# Patient Record
(demographics unavailable — no encounter records)

---

## 2025-07-25 NOTE — HISTORY OF PRESENT ILLNESS
[CPAP] : CPAP [Good Compliance] : good compliance with treatment [Good Tolerance] : good tolerance of treatment [Good Symptom Control] : good symptom control [Follow-Up - Routine Clinic] : a routine clinic follow-up of [Excess Weight] : excess weight [Currently Experiencing] : The patient is currently experiencing symptoms. [Dyspnea] : dyspnea [High] : high [Low Calorie] : low calorie [Well Balanced Diet] : well balanced meals [None] : The patient does not exercise [TextBox_4] : Never smoker S/p Covid infections in 2021 with loss of taste and smell but no respiratory symptoms and again 8/2024 with mild symptoms. No respiratory complaints.  [ESS] : 9 [TextBox_11] : 5

## 2025-07-25 NOTE — RESULTS/DATA
[TextEntry] : CXR from 8/17/23 was normal. Home PSG from 8/28/23 revealed severe HECTOR with an AHI of 33.1. The patient's overall compliance is % with a >4hr compliance of 87-93% on autoCPAP with a median and max pressure of 8.8-10.2 and 13.4-14.5 cm of water, respectively with a residual AHI of 0.8-1.2 which is normal.

## 2025-07-25 NOTE — CONSULT LETTER
[Dear  ___] : Dear  [unfilled], [Consult Letter:] : I had the pleasure of evaluating your patient, [unfilled]. [Please see my note below.] : Please see my note below. [Consult Closing:] : Thank you very much for allowing me to participate in the care of this patient.  If you have any questions, please do not hesitate to contact me. [Sincerely,] : Sincerely, [FreeTextEntry3] : Donald Joseph MD, FCCP, D. [facility] [place]

## 2025-07-25 NOTE — END OF VISIT
[Time Spent: ___ minutes] : I have spent [unfilled] minutes of time on the encounter which excludes teaching and separately reported services. [TextEntry] : Discussed with pt at length regarding HECTOR, obesity, soboe, pnds, prior Covid infection; reviewed w/u with pt as above

## 2025-07-25 NOTE — REASON FOR VISIT
[Follow-Up] : a follow-up visit [Sleep Apnea] : sleep apnea [Shortness of Breath] : shortness of breath [Obesity] : obesity [TextBox_44] : Prior Covid infections

## 2025-07-25 NOTE — DISCUSSION/SUMMARY
[FreeTextEntry1] : #1. Aries performed previously was normal. #2. The patient does not appear to require chronic BD therapy at this time. #3. Diet and exercise for weight loss #4. SOBOE is likely at least somewhat related to weight or deconditioning.  #5. CXR to evaluate SOBOE was clear on 8/17/23. #6. Continue autoCPAP to treat severe HECTOR with an AHI of 33.1; encouraged at least 70% compliance. The patient is using and benefitting from CPAP therapy. #7. Replace PAP equipment as needed; ordered 7/25/25. #8. Flonase nasal spray for postnasal drip as needed. #9. Pt had both Covid vaccines; s/p Covid infection. #10. F/u doing well with PAP therapy and will f/u at least annually.  The patient expressed understanding and agreement with the above recommendations/plan and accepts responsibility to be compliant with recommended testing, therapies, and f/u visits. All relevant questions and concerns were addressed.

## 2025-07-25 NOTE — REVIEW OF SYSTEMS
[Nasal Congestion] : nasal congestion [Postnasal Drip] : postnasal drip [Sinus Problems] : sinus problems [SOB on Exertion] : sob on exertion [Seasonal Allergies] : seasonal allergies [GERD] : gerd [Obesity] : obesity [Negative] : Psychiatric [TextBox_69] : was on PPI